# Patient Record
Sex: FEMALE | Race: WHITE | Employment: FULL TIME | URBAN - NONMETROPOLITAN AREA
[De-identification: names, ages, dates, MRNs, and addresses within clinical notes are randomized per-mention and may not be internally consistent; named-entity substitution may affect disease eponyms.]

---

## 2019-11-19 ENCOUNTER — PREP FOR PROCEDURE (OUTPATIENT)
Dept: SURGERY | Age: 44
End: 2019-11-19

## 2019-11-19 RX ORDER — SODIUM CHLORIDE 9 MG/ML
INJECTION, SOLUTION INTRAVENOUS CONTINUOUS
Status: CANCELLED | OUTPATIENT
Start: 2019-11-19

## 2023-03-08 ENCOUNTER — HOSPITAL ENCOUNTER (OUTPATIENT)
Age: 48
Setting detail: SPECIMEN
Discharge: HOME | End: 2023-03-08
Payer: COMMERCIAL

## 2023-03-08 LAB — RUBV IGG SER QL: >500 IU/ML

## 2023-03-08 PROCEDURE — 86735 MUMPS ANTIBODY: CPT

## 2023-03-08 PROCEDURE — 86762 RUBELLA ANTIBODY: CPT

## 2023-03-08 PROCEDURE — 86765 RUBEOLA ANTIBODY: CPT

## 2023-03-08 PROCEDURE — 86787 VARICELLA-ZOSTER ANTIBODY: CPT

## 2023-03-10 LAB
MUV IGG SER QL: 2.72
VZV IGG SER QL IA: 2.8

## 2023-03-21 LAB — MEASLES ANTIBODY IGG: 2.57

## 2024-01-24 PROBLEM — D50.9 IRON DEFICIENCY ANEMIA: Status: ACTIVE | Noted: 2024-01-24

## 2024-01-24 PROBLEM — K21.9 GASTROESOPHAGEAL REFLUX DISEASE: Status: ACTIVE | Noted: 2024-01-24

## 2024-01-24 PROBLEM — D50.8 IRON DEFICIENCY ANEMIA SECONDARY TO INADEQUATE DIETARY IRON INTAKE: Status: ACTIVE | Noted: 2019-07-08

## 2024-01-25 ENCOUNTER — TELEPHONE (OUTPATIENT)
Dept: GASTROENTEROLOGY | Age: 49
End: 2024-01-25

## 2024-01-31 ENCOUNTER — HOSPITAL ENCOUNTER (OUTPATIENT)
Age: 49
Setting detail: SPECIMEN
Discharge: HOME | End: 2024-01-31

## 2024-01-31 DIAGNOSIS — Z11.59 ENCOUNTER FOR HCV SCREENING TEST FOR LOW RISK PATIENT: ICD-10-CM

## 2024-01-31 DIAGNOSIS — D50.9 IRON DEFICIENCY ANEMIA, UNSPECIFIED IRON DEFICIENCY ANEMIA TYPE: ICD-10-CM

## 2024-01-31 DIAGNOSIS — Z13.1 SCREENING FOR DIABETES MELLITUS: ICD-10-CM

## 2024-01-31 DIAGNOSIS — Z13.220 SCREENING CHOLESTEROL LEVEL: ICD-10-CM

## 2024-01-31 DIAGNOSIS — Z11.4 SCREENING FOR HUMAN IMMUNODEFICIENCY VIRUS WITHOUT PRESENCE OF RISK FACTORS: ICD-10-CM

## 2024-01-31 LAB
ALBUMIN SERPL-MCNC: 3.9 G/DL (ref 3.5–5.2)
ALBUMIN/GLOB SERPL: 1.2 {RATIO} (ref 1–2.5)
ALP SERPL-CCNC: 64 U/L (ref 35–104)
ALT SERPL-CCNC: 24 U/L (ref 5–33)
ANION GAP SERPL CALCULATED.3IONS-SCNC: 9 MMOL/L (ref 9–17)
AST SERPL-CCNC: 26 U/L
BASOPHILS # BLD: 0.04 K/UL (ref 0–0.2)
BASOPHILS NFR BLD: 1 % (ref 0–2)
BILIRUB SERPL-MCNC: 0.4 MG/DL (ref 0.3–1.2)
BUN SERPL-MCNC: 12 MG/DL (ref 6–20)
CALCIUM SERPL-MCNC: 9 MG/DL (ref 8.6–10.4)
CHLORIDE SERPL-SCNC: 104 MMOL/L (ref 98–107)
CHOLEST SERPL-MCNC: 154 MG/DL
CHOLESTEROL/HDL RATIO: 2.1
CO2 SERPL-SCNC: 25 MMOL/L (ref 20–31)
CREAT SERPL-MCNC: 0.5 MG/DL (ref 0.5–0.9)
EOSINOPHIL # BLD: 0.22 K/UL (ref 0–0.44)
EOSINOPHILS RELATIVE PERCENT: 4 % (ref 1–4)
ERYTHROCYTE [DISTWIDTH] IN BLOOD BY AUTOMATED COUNT: 12.1 % (ref 11.8–14.4)
EST. AVERAGE GLUCOSE BLD GHB EST-MCNC: 103 MG/DL
GFR SERPL CREATININE-BSD FRML MDRD: >60 ML/MIN/1.73M2
GLUCOSE P FAST SERPL-MCNC: 93 MG/DL (ref 70–99)
HBA1C MFR BLD: 5.2 % (ref 4–6)
HCT VFR BLD AUTO: 40.8 % (ref 36.3–47.1)
HCV AB SERPL QL IA: NONREACTIVE
HDLC SERPL-MCNC: 74 MG/DL
HGB BLD-MCNC: 13.5 G/DL (ref 11.9–15.1)
HIV 1+2 AB+HIV1 P24 AG SERPL QL IA: NONREACTIVE
IMM GRANULOCYTES # BLD AUTO: <0.03 K/UL (ref 0–0.3)
IMM GRANULOCYTES NFR BLD: 0 %
IRON SATN MFR SERPL: 43 % (ref 20–55)
IRON SERPL-MCNC: 140 UG/DL (ref 37–145)
LDLC SERPL CALC-MCNC: 70 MG/DL (ref 0–130)
LYMPHOCYTES NFR BLD: 1.26 K/UL (ref 1.1–3.7)
LYMPHOCYTES RELATIVE PERCENT: 22 % (ref 24–43)
MCH RBC QN AUTO: 31.3 PG (ref 25.2–33.5)
MCHC RBC AUTO-ENTMCNC: 33.1 G/DL (ref 28.4–34.8)
MCV RBC AUTO: 94.4 FL (ref 82.6–102.9)
MONOCYTES NFR BLD: 0.56 K/UL (ref 0.1–1.2)
MONOCYTES NFR BLD: 10 % (ref 3–12)
NEUTROPHILS NFR BLD: 63 % (ref 36–65)
NEUTS SEG NFR BLD: 3.68 K/UL (ref 1.5–8.1)
NRBC BLD-RTO: 0 PER 100 WBC
PLATELET # BLD AUTO: 244 K/UL (ref 138–453)
PMV BLD AUTO: 10.1 FL (ref 8.1–13.5)
POTASSIUM SERPL-SCNC: 5.1 MMOL/L (ref 3.7–5.3)
PROT SERPL-MCNC: 7.1 G/DL (ref 6.4–8.3)
RBC # BLD AUTO: 4.32 M/UL (ref 3.95–5.11)
SODIUM SERPL-SCNC: 138 MMOL/L (ref 135–144)
TIBC SERPL-MCNC: 328 UG/DL (ref 250–450)
TRIGL SERPL-MCNC: 50 MG/DL
UNSATURATED IRON BINDING CAPACITY: 188 UG/DL (ref 112–347)
WBC OTHER # BLD: 5.8 K/UL (ref 3.5–11.3)

## 2024-02-05 ENCOUNTER — HOSPITAL ENCOUNTER (OUTPATIENT)
Age: 49
Setting detail: SPECIMEN
Discharge: HOME | End: 2024-02-05
Payer: COMMERCIAL

## 2024-02-05 ENCOUNTER — OFFICE VISIT (OUTPATIENT)
Dept: OBGYN | Age: 49
End: 2024-02-05
Payer: COMMERCIAL

## 2024-02-05 VITALS
BODY MASS INDEX: 30 KG/M2 | HEIGHT: 62 IN | SYSTOLIC BLOOD PRESSURE: 106 MMHG | DIASTOLIC BLOOD PRESSURE: 64 MMHG | WEIGHT: 163 LBS

## 2024-02-05 DIAGNOSIS — N81.11 CYSTOCELE, MIDLINE: ICD-10-CM

## 2024-02-05 DIAGNOSIS — N92.6 IRREGULAR PERIODS: ICD-10-CM

## 2024-02-05 DIAGNOSIS — Z01.419 WELL WOMAN EXAM: Primary | ICD-10-CM

## 2024-02-05 DIAGNOSIS — Z12.4 CERVICAL CANCER SCREENING: ICD-10-CM

## 2024-02-05 PROCEDURE — G0145 SCR C/V CYTO,THINLAYER,RESCR: HCPCS

## 2024-02-05 PROCEDURE — 87624 HPV HI-RISK TYP POOLED RSLT: CPT

## 2024-02-05 PROCEDURE — 99386 PREV VISIT NEW AGE 40-64: CPT

## 2024-02-05 ASSESSMENT — ENCOUNTER SYMPTOMS
COLOR CHANGE: 0
CHEST TIGHTNESS: 0
NAUSEA: 0
VOMITING: 0
ABDOMINAL PAIN: 0
CONSTIPATION: 0
ABDOMINAL DISTENTION: 0
WHEEZING: 0
DIARRHEA: 0
SHORTNESS OF BREATH: 0
COUGH: 0

## 2024-02-05 NOTE — PROGRESS NOTES
[facility] PHYSICIANS [facility], [facility] OBSTETRICS & GYNECOLOGY PART OF [address]  [address]  [facility] [address]  Dept: [phone]    Patient Name: Joyce Parsons  Patient Age: 48 y.o.  Date of Visit: 2/5/2024    Subjective  Chief Complaint   Patient presents with    Gynecologic Exam     Patient presents today for yearly exam. Patient states periods are becoming more frequent last 3 were 2wk apart.      Patient's last menstrual period was 01/26/2024.        HPI  Patient arrives for annual exam well woman exam and reports that her periods have become increasingly irregular. She reports they were previously very regular and then she went 45 days without a period and thought maybe they were spacing out. Then she had three periods in a row that were every two weeks.   She also reports she believes she has a mild prolapse. This is not bothersome. She declines any bowel or bladder concerns.   She declines breast concerns. She reports negative family history of breast, ovarian, colon, or uterine cancer.   She denies vaginal concerns.   Patient reports she is  sexually active with 1 male partner(s). -    Patient denies pain with intercourse. She denies bleeding with intercourse.  Patient denies a history of sexually transmitted infection(s).   Patient does not want screening for sexually transmitted infection(s).  Reports is not on contraception.   Her last pap smear was a couple of years ago and was normal. She declines any history of abnormal pap smears.     Patient works at [employer] as a cook.             1/24/2024    11:11 AM 5/5/2017    11:31 AM   PHQ Scores   PHQ2 Score 0 0   PHQ9 Score 0 0     Interpretation of Total Score Depression Severity: 1-4 = Minimal depression, 5-9 = Mild depression, 10-14 = Moderate depression, 15-19 = Moderately severe depression, 20-27 = Severe depression    Intimate Partner Violence: Not At Risk

## 2024-02-08 LAB
HPV I/H RISK 4 DNA CVX QL NAA+PROBE: NOT DETECTED
HPV SAMPLE: NORMAL
HPV, INTERPRETATION: NORMAL
HPV16 DNA CVX QL NAA+PROBE: NOT DETECTED
HPV18 DNA CVX QL NAA+PROBE: NOT DETECTED
SPECIMEN DESCRIPTION: NORMAL

## 2024-02-11 LAB — CYTOLOGY REPORT: NORMAL

## 2024-03-08 ENCOUNTER — HOSPITAL ENCOUNTER (OUTPATIENT)
Dept: WOMENS IMAGING | Age: 49
Discharge: HOME | End: 2024-03-08
Payer: COMMERCIAL

## 2024-03-08 DIAGNOSIS — Z12.31 SCREENING MAMMOGRAM FOR BREAST CANCER: ICD-10-CM

## 2024-03-08 PROCEDURE — 77063 BREAST TOMOSYNTHESIS BI: CPT

## 2024-03-28 ENCOUNTER — OFFICE VISIT (OUTPATIENT)
Dept: GASTROENTEROLOGY | Age: 49
End: 2024-03-28

## 2024-03-28 ENCOUNTER — TELEPHONE (OUTPATIENT)
Dept: GASTROENTEROLOGY | Age: 49
End: 2024-03-28

## 2024-03-28 VITALS
SYSTOLIC BLOOD PRESSURE: 98 MMHG | WEIGHT: 161.9 LBS | HEART RATE: 70 BPM | HEIGHT: 62 IN | DIASTOLIC BLOOD PRESSURE: 62 MMHG | OXYGEN SATURATION: 98 % | BODY MASS INDEX: 29.79 KG/M2 | RESPIRATION RATE: 18 BRPM

## 2024-03-28 DIAGNOSIS — Z01.818 PRE-OP TESTING: ICD-10-CM

## 2024-03-28 DIAGNOSIS — Z12.11 COLON CANCER SCREENING: Primary | ICD-10-CM

## 2024-03-28 ASSESSMENT — ENCOUNTER SYMPTOMS
BLOOD IN STOOL: 0
TROUBLE SWALLOWING: 0
ABDOMINAL PAIN: 0
ANAL BLEEDING: 0
CONSTIPATION: 1
RESPIRATORY NEGATIVE: 1
ALLERGIC/IMMUNOLOGIC NEGATIVE: 1

## 2024-03-28 NOTE — PROGRESS NOTES
[address] [address]    Chief Complaint   Patient presents with    New Patient    Colonoscopy     Patient presents today being referred by PCP to discuss colonoscopy. Patient has not had colonoscopy in the past. Patient denies family History of colon cancer. Patient reports daily bowel movements with episodes of constipation. Patient does take iron tablet daily. Denies Melena or hematochezia.          HPI Joyce Parsons is a 48 y.o. old female who has a past medical history of  anemia, dysphagia, GERD (s/p fundolipcation 2019) presenting as a new GI referral for colon cancer screening colonoscopy preop visit.  Today, Joyce denies concern with dysphagia, GERD or anemia.      Family history of upper GI cancers: Yes: Brother with esophageal cancer  Family history of colon cancer: No.  No melena.  Blood in stool: No  Unintentional weight loss: No  Abdominal pain: No  Prior colonoscopy: No  Constipation history: Yes: Occasional  Number of bowel movements a day: 0-2  Change in stool caliber: No  History of GERD or Acid Reflux: No       Past Surgical History:   Procedure Laterality Date    CHOLECYSTECTOMY  2004    Dr Brandon    HIATAL HERNIA REPAIR N/A 12/19/2019    ROBOTIC HIATAL HERNIA REPAIR WITH NISSEN FUNDOPLICATION, performed by Raza Weinstein MD at [facility] OR    TUBAL LIGATION  2004    UPPER GASTROINTESTINAL ENDOSCOPY           Current Outpatient Medications   Medication Sig Dispense Refill    NONFORMULARY Indications: provitalize      Ferrous Sulfate (IRON PO) Take by mouth      Cholecalciferol (VITAMIN D3 PO) Take by mouth       No current facility-administered medications for this visit.         Family History   Problem Relation Age of Onset    High Cholesterol Mother     High Blood Pressure Mother     Hypertension Mother     High Cholesterol Father     Diabetes Father     Heart Disease Father     Cancer Brother         esophageal    Asthma Maternal Grandmother     Diabetes

## 2024-03-28 NOTE — PATIENT INSTRUCTIONS
SURVEY:    You may be receiving a survey from [facility] regarding your visit today.    You may get this in the mail, through your MyChart, or in your email.     Please complete the survey to enable us to provide the highest quality of care to you and your family.    If you cannot score us a very good (5 Stars) on any question, please call the office to discuss how we could of made your experience exceptional.    Thank you!    MD Damaris Burch, APRN-ALPESH Crandall LPN Michelle, LPN    Phone: [phone]  Fax: [phone]    Office Hours:   M-TH 8-5, F: 8-12

## 2024-03-28 NOTE — TELEPHONE ENCOUNTER
Joyce Parsons     1975        female    [address]  [facility] [address]                    Legal Guardian NO   If yes, Name:       Skilled Facility NO     If yes, Name:                                             Home Phone: [phone]         Cell Phone:    Telephone Information:   Mobile [phone]                                           Surgeon: Denilson Surgery Date: 8/21/24                    Time:     Procedure: Colonoscopy  Duration:    Diagnosis: Screening  CPT Codes:[id number]    Important Medical History:  In Epic    First Assistant no  Special Inst/Equip/Implants: Regular    Nickel allergy :    no  Latex Allergy: No         Cardiac Device:  No  If yes, need most recent pacemaker interrogation from Cardiologist:  Type of pacemaker:    Anesthesia:    MAC                       Admission Type:  Same Day                        Admit Prior to Day of Surgery: No    Case Location:  Ambulatory            Preadmission Testing:  Phone Call             PAT Date and Time: tbd    Need Preop Cardiac Clearance: No  Need Pre-op/Medical Clearance:no    Does Patient have Cardiologist/physician? Name of Physician:        Special Needs Communication:  Phillip Lift no          needed no            Does patient sign for self  yes

## 2024-08-09 ENCOUNTER — HOSPITAL ENCOUNTER (OUTPATIENT)
Age: 49
Discharge: HOME | End: 2024-08-09
Payer: MEDICARE

## 2024-08-09 DIAGNOSIS — Z01.818 PRE-OP TESTING: ICD-10-CM

## 2024-08-09 DIAGNOSIS — Z12.11 COLON CANCER SCREENING: ICD-10-CM

## 2024-08-09 PROCEDURE — 93005 ELECTROCARDIOGRAM TRACING: CPT

## 2024-08-10 LAB
EKG ATRIAL RATE: 68 BPM
EKG P AXIS: 73 DEGREES
EKG P-R INTERVAL: 142 MS
EKG Q-T INTERVAL: 402 MS
EKG QRS DURATION: 80 MS
EKG QTC CALCULATION (BAZETT): 427 MS
EKG R AXIS: 64 DEGREES
EKG T AXIS: 9 DEGREES
EKG VENTRICULAR RATE: 68 BPM

## 2024-08-15 ENCOUNTER — TELEPHONE (OUTPATIENT)
Dept: GASTROENTEROLOGY | Age: 49
End: 2024-08-15

## 2024-08-15 NOTE — TELEPHONE ENCOUNTER
Spoke with patient, made aware of Dr. Denilson AG. Patient voices she is in agreement to proceed with procedure with Dr. Guajardo.

## 2024-08-19 ENCOUNTER — TELEPHONE (OUTPATIENT)
Dept: SURGERY | Age: 49
End: 2024-08-19

## 2024-08-19 NOTE — TELEPHONE ENCOUNTER
Change in bowel habits    Do you have any medication allergies?   [x] Yes  If yes, please list:   Cow's Milk Low       [] No    Do you take Warfarin, Coumadin, Plavix, Eliquis, Xarelto, or aspirin OR do you take a medication that thins your blood?  [] Yes  [x] No    Medications:    Current Outpatient Medications on File Prior to Visit   Medication Sig Dispense Refill    NONFORMULARY Indications: provitalize      Ferrous Sulfate (IRON PO) Take by mouth      Cholecalciferol (VITAMIN D3 PO) Take by mouth       No current facility-administered medications on file prior to visit.         Surgical History:    Past Surgical History:   Procedure Laterality Date    CHOLECYSTECTOMY  2004    Dr Brandon    HIATAL HERNIA REPAIR N/A 12/19/2019    ROBOTIC HIATAL HERNIA REPAIR WITH NISSEN FUNDOPLICATION, performed by Raza Weinstein MD at [facility] OR    TUBAL LIGATION  2004    UPPER GASTROINTESTINAL ENDOSCOPY           Medical History:     Past Medical History:   Diagnosis Date    Abdominal hernia     Anemia     Dysphagia     GERD (gastroesophageal reflux disease)     Gestational diabetes mellitus     Infertility, female     Menopausal symptoms           List the medical problems you are currently being treated for:                                 Reviewed by nurse: Viviana Sorensen LPN      SURGERY SCHEDULED FOR 8/21/24      ADDITIONAL NOTES: Seen in Clinic by GI NP on 3/28/24

## 2024-08-20 ENCOUNTER — ANESTHESIA EVENT (OUTPATIENT)
Dept: OPERATING ROOM | Age: 49
End: 2024-08-20
Payer: COMMERCIAL

## 2024-08-20 NOTE — PROGRESS NOTES
Patient states they received their colon prep instructions and home medications that are to be taken on the day of their procedure with a small sip of water only, from the physician's office. Patient stated she stopped her OTC vitamins about 1 week ago.

## 2024-08-21 ENCOUNTER — ANESTHESIA (OUTPATIENT)
Dept: OPERATING ROOM | Age: 49
End: 2024-08-21
Payer: COMMERCIAL

## 2024-08-21 ENCOUNTER — HOSPITAL ENCOUNTER (OUTPATIENT)
Age: 49
Setting detail: OUTPATIENT SURGERY
Discharge: HOME | End: 2024-08-21
Attending: SURGERY | Admitting: SURGERY
Payer: COMMERCIAL

## 2024-08-21 VITALS
HEIGHT: 62 IN | DIASTOLIC BLOOD PRESSURE: 60 MMHG | BODY MASS INDEX: 28.97 KG/M2 | TEMPERATURE: 97.4 F | RESPIRATION RATE: 20 BRPM | OXYGEN SATURATION: 98 % | SYSTOLIC BLOOD PRESSURE: 105 MMHG | HEART RATE: 73 BPM | WEIGHT: 157.4 LBS

## 2024-08-21 PROCEDURE — 7100000010 HC PHASE II RECOVERY - FIRST 15 MIN: Performed by: SURGERY

## 2024-08-21 PROCEDURE — 2580000003 HC RX 258: Performed by: NURSE ANESTHETIST, CERTIFIED REGISTERED

## 2024-08-21 PROCEDURE — 3700000000 HC ANESTHESIA ATTENDED CARE: Performed by: SURGERY

## 2024-08-21 PROCEDURE — 3700000001 HC ADD 15 MINUTES (ANESTHESIA): Performed by: SURGERY

## 2024-08-21 PROCEDURE — 2500000003 HC RX 250 WO HCPCS: Performed by: NURSE ANESTHETIST, CERTIFIED REGISTERED

## 2024-08-21 PROCEDURE — 3609027000 HC COLONOSCOPY: Performed by: SURGERY

## 2024-08-21 PROCEDURE — 7100000011 HC PHASE II RECOVERY - ADDTL 15 MIN: Performed by: SURGERY

## 2024-08-21 PROCEDURE — 45378 DIAGNOSTIC COLONOSCOPY: CPT | Performed by: SURGERY

## 2024-08-21 PROCEDURE — 6360000002 HC RX W HCPCS: Performed by: NURSE ANESTHETIST, CERTIFIED REGISTERED

## 2024-08-21 PROCEDURE — 2709999900 HC NON-CHARGEABLE SUPPLY: Performed by: SURGERY

## 2024-08-21 RX ORDER — PROPOFOL 10 MG/ML
INJECTION, EMULSION INTRAVENOUS PRN
Status: DISCONTINUED | OUTPATIENT
Start: 2024-08-21 | End: 2024-08-21 | Stop reason: SDUPTHER

## 2024-08-21 RX ORDER — LIDOCAINE HYDROCHLORIDE 20 MG/ML
INJECTION, SOLUTION EPIDURAL; INFILTRATION; INTRACAUDAL; PERINEURAL PRN
Status: DISCONTINUED | OUTPATIENT
Start: 2024-08-21 | End: 2024-08-21 | Stop reason: SDUPTHER

## 2024-08-21 RX ORDER — SODIUM CHLORIDE, SODIUM LACTATE, POTASSIUM CHLORIDE, CALCIUM CHLORIDE 600; 310; 30; 20 MG/100ML; MG/100ML; MG/100ML; MG/100ML
INJECTION, SOLUTION INTRAVENOUS CONTINUOUS
Status: DISCONTINUED | OUTPATIENT
Start: 2024-08-21 | End: 2024-08-21 | Stop reason: HOSPADM

## 2024-08-21 RX ADMIN — LIDOCAINE HYDROCHLORIDE 60 MG: 20 INJECTION, SOLUTION EPIDURAL; INFILTRATION; INTRACAUDAL; PERINEURAL at 15:36

## 2024-08-21 RX ADMIN — PROPOFOL 200 MCG/KG/MIN: 10 INJECTION, EMULSION INTRAVENOUS at 15:37

## 2024-08-21 RX ADMIN — SODIUM CHLORIDE, POTASSIUM CHLORIDE, SODIUM LACTATE AND CALCIUM CHLORIDE: 600; 310; 30; 20 INJECTION, SOLUTION INTRAVENOUS at 12:58

## 2024-08-21 RX ADMIN — SODIUM CHLORIDE, POTASSIUM CHLORIDE, SODIUM LACTATE AND CALCIUM CHLORIDE: 600; 310; 30; 20 INJECTION, SOLUTION INTRAVENOUS at 16:02

## 2024-08-21 RX ADMIN — PROPOFOL 80 MG: 10 INJECTION, EMULSION INTRAVENOUS at 15:36

## 2024-08-21 ASSESSMENT — PAIN - FUNCTIONAL ASSESSMENT: PAIN_FUNCTIONAL_ASSESSMENT: NONE - DENIES PAIN

## 2024-08-21 NOTE — ANESTHESIA POSTPROCEDURE EVALUATION
Department of Anesthesiology  Postprocedure Note    Patient: Joyce Parsons  MRN: [id number]  YOB: 1975  Date of evaluation: 8/21/2024    Procedure Summary       Date: 08/21/24 Room / Location: [place] [address] / [facility]    Anesthesia Start: 1534 Anesthesia Stop: 1608    Procedure: COLORECTAL CANCER SCREENING, NOT HIGH RISK (Abdomen) Diagnosis:       Screening for colon cancer      (Screening for colon cancer [Z12.11])    Surgeons: Rosa Maria Guajardo DO Responsible Provider: Floridalma Younger APRN - CRNA    Anesthesia Type: general, TIVA ASA Status: 2            Anesthesia Type: No value filed.    Antoine Phase I: Antoine Score: 10    Antoine Phase II: Antoine Score: 10    Anesthesia Post Evaluation    Patient location during evaluation: PACU  Patient participation: complete - patient participated  Level of consciousness: awake and alert  Airway patency: patent  Nausea & Vomiting: no nausea and no vomiting  Cardiovascular status: blood pressure returned to baseline  Respiratory status: acceptable  Hydration status: euvolemic  Pain management: adequate and satisfactory to patient    No notable events documented.

## 2024-08-21 NOTE — PROGRESS NOTES
Discharge instructions reviewed with pt and son Jn. All questions answered. Pt verbalizes readiness to go home.    Discharge Criteria    Inpatients must meet Criteria 1 through 7. All other patients are either YES or N/A. If a NO is chosen then Anesthesia or Surgeon must be notified.      1.  Minimum 30 minutes after last dose of sedative medication.    Yes      2.  Systolic BP between 90 - 160. Diastolic BP between 60 - 90.    Yes      3.  Pulse between 60 - 120    Yes      4.  Respirations between 8 - 25.    Yes      5.  SpO2 92% - 100%.    Yes      6.  Able to cough and swallow or return to baseline function.    Yes      7.  Alert and oriented or return to baseline mental status.    Yes      8.  Demonstrates controlled, coordinated movements, ambulates with steady gait, or return to baseline activity function.    Yes      9.  Minimal or no pain or nausea, or at a level tolerable and acceptable to patient.    Yes      10. Takes and retains oral fluids as allowed.    Yes      11. Procedural / perioperative site stable.  Minimal or no bleeding.    Yes          12. If GI endoscopy procedure, minimal or no abdominal distention or passing flatus.    Yes      13. Written discharge instructions and emergency telephone number provided.    Yes      14. Accompanied by a responsible adult.    Yes

## 2024-08-21 NOTE — ANESTHESIA PRE PROCEDURE
Counseling given: Not Answered      Vital Signs (Current):   Vitals:    08/20/24 1500 08/21/24 1239 08/21/24 1253   BP:   108/62   Pulse:   63   Resp:   12   Temp:   36.1 °C (96.9 °F)   TempSrc:   Temporal   SpO2:   100%   Weight: 72.6 kg (160 lb) 71.4 kg (157 lb 6.4 oz)    Height: 1.575 m (5' 2\") 1.575 m (5' 2\")                                               BP Readings from Last 3 Encounters:   08/21/24 108/62   03/28/24 98/62   02/05/24 106/64       NPO Status: Time of last liquid consumption: 2355                        Time of last solid consumption: 1800                        Date of last liquid consumption: 08/20/24                        Date of last solid food consumption: 08/19/24    BMI:   Wt Readings from Last 3 Encounters:   08/21/24 71.4 kg (157 lb 6.4 oz)   03/28/24 73.4 kg (161 lb 14.4 oz)   02/05/24 73.9 kg (163 lb)     Body mass index is 28.79 kg/m².    CBC:   Lab Results   Component Value Date/Time    WBC 5.8 01/31/2024 11:31 AM    RBC 4.32 01/31/2024 11:31 AM    HGB 13.5 01/31/2024 11:31 AM    HCT 40.8 01/31/2024 11:31 AM    MCV 94.4 01/31/2024 11:31 AM    RDW 12.1 01/31/2024 11:31 AM     01/31/2024 11:31 AM       CMP:   Lab Results   Component Value Date/Time     01/31/2024 11:31 AM    K 5.1 01/31/2024 11:31 AM     01/31/2024 11:31 AM    CO2 25 01/31/2024 11:31 AM    BUN 12 01/31/2024 11:31 AM    CREATININE 0.5 01/31/2024 11:31 AM    LABGLOM >60 01/31/2024 11:31 AM    GLUCOSE 99 03/15/2018 09:34 AM    CALCIUM 9.0 01/31/2024 11:31 AM    BILITOT 0.4 01/31/2024 11:31 AM    ALKPHOS 64 01/31/2024 11:31 AM    AST 26 01/31/2024 11:31 AM    ALT 24 01/31/2024 11:31 AM       POC Tests: No results for input(s): \"POCGLU\", \"POCNA\", \"POCK\", \"POCCL\", \"POCBUN\", \"POCHEMO\", \"POCHCT\" in the last 72 hours.    Coags: No results found for: \"PROTIME\", \"INR\", \"APTT\"    HCG (If Applicable): No results found for: \"PREGTESTUR\", \"PREGSERUM\", \"HCG\", \"HCGQUANT\"     ABGs: No results found for: \"PHART\",

## 2024-08-21 NOTE — H&P
GENERAL SURGERY CONSULTATION      Patient's Name/ Date of Birth/ Gender: Joyce Parsons / 1975 (49 y.o.) / female     PCP: Bridget Judge APRN - CNP  Referring:     History of present Illness:  Patient is a pleasant 49 y.o. female  kindly referred by Bridget Judge APRN - CNP    Past Medical History:  has a past medical history of Abdominal hernia, Anemia, Dysphagia, GERD (gastroesophageal reflux disease), Gestational diabetes mellitus, Infertility, female, and Menopausal symptoms.    Past Surgical History:   Past Surgical History:   Procedure Laterality Date    CHOLECYSTECTOMY  2004    Dr Brandon    HIATAL HERNIA REPAIR N/A 12/19/2019    ROBOTIC HIATAL HERNIA REPAIR WITH NISSEN FUNDOPLICATION, performed by Raza Weinstein MD at [facility] OR    TUBAL LIGATION  2004    UPPER GASTROINTESTINAL ENDOSCOPY         Social History:  reports that she has never smoked. She has never used smokeless tobacco. She reports that she does not drink alcohol and does not use drugs.    Family History: family history includes Arthritis in her maternal grandfather; Asthma in her maternal grandmother; Cancer in her brother; Diabetes in her father, maternal grandfather, maternal grandmother, paternal grandfather, and paternal grandmother; Heart Disease in her father, maternal grandfather, and paternal grandmother; High Blood Pressure in her mother; High Cholesterol in her father and mother; Hypertension in her mother and paternal grandfather; Stroke in her maternal grandfather.    Review of Systems:   General: Completed and, except as mentioned above, was negative or noncontributory  Psychological:  Completed and, except as mentioned above, was negative or noncontributory  Ophthalmic:  Completed and, except as mentioned above, was negative or noncontributory  ENT:  Completed and, except as mentioned above, was negative or noncontributory  Allergy and Immunology:  Completed and, except as mentioned above, was negative or  gross focal motor deficits  Skin: No erythema or ulcerations     Labs:   Lab Results   Component Value Date/Time    WBC 5.8 01/31/2024 11:31 AM    HGB 13.5 01/31/2024 11:31 AM    HCT 40.8 01/31/2024 11:31 AM    MCV 94.4 01/31/2024 11:31 AM     01/31/2024 11:31 AM     Lab Results   Component Value Date/Time     01/31/2024 11:31 AM    K 5.1 01/31/2024 11:31 AM     01/31/2024 11:31 AM    CO2 25 01/31/2024 11:31 AM    BUN 12 01/31/2024 11:31 AM    CREATININE 0.5 01/31/2024 11:31 AM    GLUCOSE 99 03/15/2018 09:34 AM    CALCIUM 9.0 01/31/2024 11:31 AM     Lab Results   Component Value Date/Time    ALKPHOS 64 01/31/2024 11:31 AM    ALT 24 01/31/2024 11:31 AM    AST 26 01/31/2024 11:31 AM    BILITOT 0.4 01/31/2024 11:31 AM     No results found for: \"AMYLASE\"  No results found for: \"LIPASE\"  No results found for: \"INR\"    Radiologic Studies:      Impressions/Recommendations:     Screening colonoscopy  Risks and benefits of colonoscopy have been discussed in detail with the patient.  Risks of the procedure include bleeding, bowel perforation, possibly missing smaller lesions if the bowel prep is not adequate. Alternative studies include barium enema and virtual colonoscopy; however, if a lesion is seen, then one would still need to proceed with the gold standard colonoscopy to retrieve or biopsy the lesion.  All the patient's questions are answered. Will proceed with colonoscopy under MAC.         Thank you Bridget Judge, APRN - CNP for allowing me to participate in the care of your patients.     COURTNEY Guajardo DO, MPH, FACOS, FACS  General Surgery, [facility], [facility] office [phone]  [place] office [phone]    H&P  General Surgery        Pt Name: Joyce Parsons  MRN: [id number]  YOB: 1975  Date of evaluation: 8/21/2024      [x] I have examined the patient and reviewed the H&P/Consult completed, and there are no changes to the patient or plans.     [] I have

## 2024-08-21 NOTE — OP NOTE
Operative Note      Patient: Joyce Parsons  YOB: 1975  MRN: [id number]  Bridget Judge, APRN - CNP      Date of Procedure: 8/21/2024    Pre-Op Diagnosis Codes:      * Screening for colon cancer [Z12.11]    Post-Op Diagnosis: Same       Procedure:    Colonoscopy to cecum      Surgeon(s):  Rosa Maria Guajardo DO    Assistant:   * No surgical staff found *    Anesthesia: Monitor Anesthesia Care    Estimated Blood Loss (mL): none    Complications: None    Specimens:   * No specimens in log *    Implants:  * No implants in log *      Drains: * No LDAs found *    Findings:  Infection Present At Time Of Surgery (PATOS) (choose all levels that have infection present):  No infection present      Procedure details:    I do meet with the patient in preoperative holding area. Please see H&P for indications for the above named procedure. Patient was brought to the endoscopy suite and placed under monitored anesthesia. Patient was positioned in left lateral position. Time out called and procedure confirmed. Rectal examination performed. The lubricated variable stiffness pediatric colonoscope was carefully passed under direct vision. Findings:     Cecum: normal cecal pouch. IC valve and appendiceal orifice well confirmed   Ascending: negative  Transverse: negative  Descending: negative  Sigmoid: negative  Rectum: negative  Rectal exam: negative  Bowel prep quality: good. Additional time spent pedal irrigation in left colon and cecum.     At the distal 1/3 of the rectum, the scope was retroflexed and was negative. The patient tolerated the procedure well.  The abdomen was soft after the procedure. I spoke with pt/family afterwards.     Next colonoscopy for screening: 10 years      Electronically signed by Rosa Maria Guajardo DO, FACOS, FACS on 8/24/2024 at 9:16 PM

## 2025-02-21 ENCOUNTER — HOSPITAL ENCOUNTER (OUTPATIENT)
Age: 50
Discharge: HOME | End: 2025-02-21
Payer: MEDICAID

## 2025-02-21 DIAGNOSIS — Z13.220 SCREENING CHOLESTEROL LEVEL: ICD-10-CM

## 2025-02-21 DIAGNOSIS — Z13.1 SCREENING FOR DIABETES MELLITUS: ICD-10-CM

## 2025-02-21 DIAGNOSIS — D50.9 IRON DEFICIENCY ANEMIA, UNSPECIFIED IRON DEFICIENCY ANEMIA TYPE: ICD-10-CM

## 2025-02-21 LAB
ALBUMIN SERPL-MCNC: 3.9 G/DL (ref 3.5–5.2)
ALBUMIN/GLOB SERPL: 1.4 {RATIO} (ref 1–2.5)
ALP SERPL-CCNC: 74 U/L (ref 35–104)
ALT SERPL-CCNC: 13 U/L (ref 10–35)
ANION GAP SERPL CALCULATED.3IONS-SCNC: 9 MMOL/L (ref 9–16)
AST SERPL-CCNC: 15 U/L (ref 10–35)
BASOPHILS # BLD: 0.04 K/UL (ref 0–0.2)
BASOPHILS NFR BLD: 1 % (ref 0–2)
BILIRUB SERPL-MCNC: 0.4 MG/DL (ref 0–1.2)
BUN SERPL-MCNC: 10 MG/DL (ref 6–20)
BUN/CREAT SERPL: 17 (ref 9–20)
CALCIUM SERPL-MCNC: 9.1 MG/DL (ref 8.6–10.4)
CHLORIDE SERPL-SCNC: 105 MMOL/L (ref 98–107)
CHOLEST SERPL-MCNC: 142 MG/DL (ref 0–199)
CHOLESTEROL/HDL RATIO: 2.1
CO2 SERPL-SCNC: 25 MMOL/L (ref 20–31)
CREAT SERPL-MCNC: 0.6 MG/DL (ref 0.5–0.9)
EOSINOPHIL # BLD: 0.17 K/UL (ref 0–0.44)
EOSINOPHILS RELATIVE PERCENT: 3 % (ref 1–4)
ERYTHROCYTE [DISTWIDTH] IN BLOOD BY AUTOMATED COUNT: 12.5 % (ref 11.8–14.4)
GFR, ESTIMATED: >90 ML/MIN/1.73M2
GLUCOSE P FAST SERPL-MCNC: 89 MG/DL (ref 74–99)
HCT VFR BLD AUTO: 37.1 % (ref 36.3–47.1)
HDLC SERPL-MCNC: 67 MG/DL
HGB BLD-MCNC: 12.5 G/DL (ref 11.9–15.1)
IMM GRANULOCYTES # BLD AUTO: <0.03 K/UL (ref 0–0.3)
IMM GRANULOCYTES NFR BLD: 0 %
IRON SATN MFR SERPL: 29 % (ref 20–55)
IRON SERPL-MCNC: 92 UG/DL (ref 37–145)
LDLC SERPL CALC-MCNC: 64 MG/DL (ref 0–100)
LYMPHOCYTES NFR BLD: 1.25 K/UL (ref 1.1–3.7)
LYMPHOCYTES RELATIVE PERCENT: 20 % (ref 24–43)
MCH RBC QN AUTO: 31.5 PG (ref 25.2–33.5)
MCHC RBC AUTO-ENTMCNC: 33.7 G/DL (ref 28.4–34.8)
MCV RBC AUTO: 93.5 FL (ref 82.6–102.9)
MONOCYTES NFR BLD: 0.54 K/UL (ref 0.1–1.2)
MONOCYTES NFR BLD: 9 % (ref 3–12)
NEUTROPHILS NFR BLD: 67 % (ref 36–65)
NEUTS SEG NFR BLD: 4.38 K/UL (ref 1.5–8.1)
NRBC BLD-RTO: 0 PER 100 WBC
PLATELET # BLD AUTO: 226 K/UL (ref 138–453)
PMV BLD AUTO: 9.6 FL (ref 8.1–13.5)
POTASSIUM SERPL-SCNC: 4.6 MMOL/L (ref 3.7–5.3)
PROT SERPL-MCNC: 6.8 G/DL (ref 6.6–8.7)
RBC # BLD AUTO: 3.97 M/UL (ref 3.95–5.11)
SODIUM SERPL-SCNC: 139 MMOL/L (ref 136–145)
TIBC SERPL-MCNC: 320 UG/DL (ref 250–450)
TRIGL SERPL-MCNC: 56 MG/DL (ref 0–149)
UNSATURATED IRON BINDING CAPACITY: 228 UG/DL (ref 112–347)
VLDLC SERPL CALC-MCNC: 11 MG/DL (ref 1–30)
WBC OTHER # BLD: 6.4 K/UL (ref 3.5–11.3)

## 2025-02-21 PROCEDURE — 83550 IRON BINDING TEST: CPT

## 2025-02-21 PROCEDURE — 85025 COMPLETE CBC W/AUTO DIFF WBC: CPT

## 2025-02-21 PROCEDURE — 80053 COMPREHEN METABOLIC PANEL: CPT

## 2025-02-21 PROCEDURE — 83540 ASSAY OF IRON: CPT

## 2025-02-21 PROCEDURE — 36415 COLL VENOUS BLD VENIPUNCTURE: CPT

## 2025-02-21 PROCEDURE — 80061 LIPID PANEL: CPT

## (undated) DEVICE — SOLUTION IRRIG 1000ML 0.9% SOD CHL USP POUR PLAS BTL

## (undated) DEVICE — TUBING SUCT NON-STRL 9/32X100 W/CNNT

## (undated) DEVICE — CANNULA ORAL NSL AD CO2 N INTUB O2 DEL DISP TRU LNK